# Patient Record
Sex: MALE | Race: WHITE | NOT HISPANIC OR LATINO | Employment: UNEMPLOYED | URBAN - METROPOLITAN AREA
[De-identification: names, ages, dates, MRNs, and addresses within clinical notes are randomized per-mention and may not be internally consistent; named-entity substitution may affect disease eponyms.]

---

## 2020-07-09 ENCOUNTER — HOSPITAL ENCOUNTER (EMERGENCY)
Facility: MEDICAL CENTER | Age: 20
End: 2020-07-09
Attending: EMERGENCY MEDICINE
Payer: COMMERCIAL

## 2020-07-09 VITALS
HEART RATE: 77 BPM | DIASTOLIC BLOOD PRESSURE: 65 MMHG | WEIGHT: 160 LBS | BODY MASS INDEX: 21.67 KG/M2 | TEMPERATURE: 97.7 F | OXYGEN SATURATION: 97 % | SYSTOLIC BLOOD PRESSURE: 127 MMHG | RESPIRATION RATE: 20 BRPM | HEIGHT: 72 IN

## 2020-07-09 DIAGNOSIS — F41.9 ANXIETY: ICD-10-CM

## 2020-07-09 DIAGNOSIS — R10.10 PAIN OF UPPER ABDOMEN: ICD-10-CM

## 2020-07-09 PROCEDURE — 99284 EMERGENCY DEPT VISIT MOD MDM: CPT

## 2020-07-09 RX ORDER — LORAZEPAM 1 MG/1
0.5 TABLET ORAL DAILY
COMMUNITY

## 2020-07-09 RX ORDER — BUPROPION HYDROCHLORIDE 150 MG/1
150 TABLET, EXTENDED RELEASE ORAL DAILY
COMMUNITY

## 2020-07-09 SDOH — HEALTH STABILITY: MENTAL HEALTH: HOW OFTEN DO YOU HAVE A DRINK CONTAINING ALCOHOL?: NEVER

## 2020-07-09 NOTE — ED TRIAGE NOTES
"[facility] [place]     Chief Complaint   Patient presents with   • Abdominal Pain     RUQ, worse when bending over   • Anxiety       Patient to triage for above complaint. Patient states he was brushing his teeth when he bent over and began having a sharp pain in the RUQ abd. Patient states he has a hx of anxiety which he takes lorazepam and Wellbutrin for, and on the way to the ER patient states his, \"heart started racing, my hands, arms, and face became numb, and my arms/hands were clenched\". Patient states the numbness and tightness have since gone away. Patient is anxious in triage.     Attempted to calm patient verbally, VS re-check: , /81.  Educated patient on triage process, mask in place, back out to lobby.     Blood Pressure: (!) 171/122, Pulse: (!) 150, Respiration: 20, Temperature: 36.5 °C (97.7 °F), Height: 182.9 cm (6'), Weight: 72.6 kg (160 lb), BMI (Calculated): 21.7, BSA (Calculated): 1.9, Pulse Oximetry: 97 %     "

## 2020-07-09 NOTE — ED NOTES
Patient educated on discharge instructions  and home care. Patient verbalized understanding. Patient ambulated to [facility].

## 2020-07-09 NOTE — ED PROVIDER NOTES
ED Provider Note    CHIEF COMPLAINT  Chief Complaint   Patient presents with   • Abdominal Pain     RUQ, worse when bending over   • Anxiety       HPI  Aj Coronado is a 19 y.o. male who presents to the emergency department with brief period of abdominal pain followed by anxiety attack.  He explains that he has longstanding history of anxiety.  Was taking his nightly medication including Ativan and Wellbutrin.  However he then follow this with albuterol for his underlying asthma which is had a recent flare.  After taking his albuterol he had increased heart rate and then started become more anxious.  This then progressed with perioral paresthesias and carpal spasms.  The belly pain quickly resolved.  He believe that the belly pain may have been secondary to eating "[employer] factory hamburger.  Currently all of the above symptoms have abated and feeling well.  He states that he is getting COVID testing tomorrow and he does work at the [employer].  No known sick contacts.  No fever chills.  No nausea or vomiting.    REVIEW OF SYSTEMS  See HPI for further details. All other systems are negative.     PAST MEDICAL HISTORY       SOCIAL HISTORY  Social History     Tobacco Use   • Smoking status: Never Smoker   • Smokeless tobacco: Never Used   Substance and Sexual Activity   • Alcohol use: Never     Frequency: Never   • Drug use: Never   • Sexual activity: Not on file       SURGICAL HISTORY  patient denies any surgical history    CURRENT MEDICATIONS  Home Medications     Reviewed by Ann Marie Dockery R.N. (Registered Nurse) on 07/09/20 at 0043  Med List Status: Complete   Medication Last Dose Status   buPROPion SR (WELLBUTRIN-SR) 150 MG TABLET SR 12 HR sustained-release tablet  Active   LORazepam (ATIVAN) 1 MG Tab  Active                ALLERGIES  No Known Allergies    PHYSICAL EXAM  VITAL SIGNS: /65   Pulse 77   Temp 36.5 °C (97.7 °F)   Resp 20   Ht 1.829 m (6')   Wt 72.6 kg (160 lb)   SpO2 97%    BMI 21.70 kg/m²  @REGINO[[id number]::@  Pulse ox interpretation: I interpret this pulse ox as normal.  Constitutional: Alert in no apparent distress.  HENT: Normocephalic, Atraumatic, Bilateral external ears normal. Nose normal.   Eyes: Pupils are equal and reactive. Conjunctiva normal, non-icteric.   Heart: Regular rate and rythm, no murmurs.    Lungs: Clear to auscultation bilaterally.  Skin: Warm, Dry, No erythema, No rash.   Neurologic: Alert, Grossly non-focal.   Psychiatric: Affect normal, Judgment normal, Mood normal, Appears appropriate and not intoxicated.         COURSE & MEDICAL DECISION MAKING  Pertinent Labs & Imaging studies reviewed. (See chart for details)  Patient presented the emerge department the above complaint.  Brief period of upper abdominal discomfort followed by anxiety.  History as above.  I believe that the primary presentation issue is the underlying anxiety.  At this point patient is feeling better.  Will discharge home with ongoing home care for anxiety as previously prescribed.  The patient will not drink alcohol nor drive with prescribed medications. The patient will return for worsening symptoms and is stable at the time of discharge. The patient verbalizes understanding and will comply.    FINAL IMPRESSION  1. Anxiety    2. Pain of upper abdomen               Electronically signed by: Josesito Javier M.D., 7/9/2020 1:48 AM